# Patient Record
Sex: MALE | URBAN - METROPOLITAN AREA
[De-identification: names, ages, dates, MRNs, and addresses within clinical notes are randomized per-mention and may not be internally consistent; named-entity substitution may affect disease eponyms.]

---

## 2024-06-17 ENCOUNTER — TELEPHONE (OUTPATIENT)
Dept: PRIMARY CARE | Facility: CLINIC | Age: 39
End: 2024-06-17

## 2024-06-17 NOTE — TELEPHONE ENCOUNTER
Received fax from , Wood Clarke, regarding thsi patient who I saw at [facility] approximately 1 year ago.     I do not have access to records from [facility] to provide to . I did call him to discuss further, however, he did not answer and therefore I elft a message.